# Patient Record
Sex: FEMALE | Race: WHITE
[De-identification: names, ages, dates, MRNs, and addresses within clinical notes are randomized per-mention and may not be internally consistent; named-entity substitution may affect disease eponyms.]

---

## 2021-11-08 ENCOUNTER — HOSPITAL ENCOUNTER (OUTPATIENT)
Dept: HOSPITAL 35 - SJCVC | Age: 71
End: 2021-11-08
Attending: INTERNAL MEDICINE
Payer: COMMERCIAL

## 2021-11-08 DIAGNOSIS — Z79.82: ICD-10-CM

## 2021-11-08 DIAGNOSIS — Z88.8: ICD-10-CM

## 2021-11-08 DIAGNOSIS — I25.10: ICD-10-CM

## 2021-11-08 DIAGNOSIS — N18.2: ICD-10-CM

## 2021-11-08 DIAGNOSIS — R94.31: Primary | ICD-10-CM

## 2021-11-08 DIAGNOSIS — I12.9: ICD-10-CM

## 2021-11-08 DIAGNOSIS — Z79.899: ICD-10-CM

## 2021-11-08 DIAGNOSIS — E78.5: ICD-10-CM

## 2021-11-08 DIAGNOSIS — Z88.0: ICD-10-CM

## 2021-11-08 DIAGNOSIS — I73.9: ICD-10-CM

## 2021-11-08 DIAGNOSIS — F32.9: ICD-10-CM

## 2021-11-08 DIAGNOSIS — R55: ICD-10-CM

## 2021-11-08 DIAGNOSIS — Z87.891: ICD-10-CM

## 2021-11-15 ENCOUNTER — HOSPITAL ENCOUNTER (OUTPATIENT)
Dept: HOSPITAL 35 - SJCVCIMAG | Age: 71
End: 2021-11-15
Attending: INTERNAL MEDICINE
Payer: COMMERCIAL

## 2021-11-15 DIAGNOSIS — I65.23: Primary | ICD-10-CM

## 2021-11-15 DIAGNOSIS — Z88.0: ICD-10-CM

## 2021-11-15 DIAGNOSIS — I12.9: ICD-10-CM

## 2021-11-15 DIAGNOSIS — F17.200: ICD-10-CM

## 2021-11-15 DIAGNOSIS — R53.83: ICD-10-CM

## 2021-11-15 DIAGNOSIS — Z79.899: ICD-10-CM

## 2021-11-15 DIAGNOSIS — N18.2: ICD-10-CM

## 2021-11-15 DIAGNOSIS — Z79.82: ICD-10-CM

## 2021-11-15 DIAGNOSIS — E72.11: ICD-10-CM

## 2021-11-15 DIAGNOSIS — I70.8: ICD-10-CM

## 2021-11-15 DIAGNOSIS — I25.10: ICD-10-CM

## 2021-11-15 DIAGNOSIS — F03.90: ICD-10-CM

## 2021-11-15 DIAGNOSIS — F41.8: ICD-10-CM

## 2021-11-15 DIAGNOSIS — I70.202: ICD-10-CM

## 2021-11-15 DIAGNOSIS — M79.604: ICD-10-CM

## 2021-11-15 DIAGNOSIS — E78.5: ICD-10-CM

## 2021-11-15 DIAGNOSIS — M79.605: ICD-10-CM

## 2021-11-15 DIAGNOSIS — Z88.8: ICD-10-CM

## 2021-11-15 DIAGNOSIS — R55: ICD-10-CM

## 2021-11-29 ENCOUNTER — HOSPITAL ENCOUNTER (OUTPATIENT)
Dept: HOSPITAL 35 - SJCVCIMAG | Age: 71
End: 2021-11-29
Attending: INTERNAL MEDICINE
Payer: COMMERCIAL

## 2021-11-29 DIAGNOSIS — R55: ICD-10-CM

## 2021-11-29 DIAGNOSIS — Z87.891: ICD-10-CM

## 2021-11-29 DIAGNOSIS — I25.10: Primary | ICD-10-CM

## 2021-11-29 DIAGNOSIS — Z79.899: ICD-10-CM

## 2021-11-29 DIAGNOSIS — I10: ICD-10-CM

## 2021-11-29 DIAGNOSIS — Z79.82: ICD-10-CM

## 2021-12-01 ENCOUNTER — HOSPITAL ENCOUNTER (OUTPATIENT)
Dept: HOSPITAL 35 - SJCVCIMAG | Age: 71
End: 2021-12-01
Attending: INTERNAL MEDICINE
Payer: COMMERCIAL

## 2021-12-01 DIAGNOSIS — R55: ICD-10-CM

## 2021-12-01 DIAGNOSIS — Z88.0: ICD-10-CM

## 2021-12-01 DIAGNOSIS — I73.9: ICD-10-CM

## 2021-12-01 DIAGNOSIS — R53.83: ICD-10-CM

## 2021-12-01 DIAGNOSIS — Z79.82: ICD-10-CM

## 2021-12-01 DIAGNOSIS — I13.10: ICD-10-CM

## 2021-12-01 DIAGNOSIS — M48.061: ICD-10-CM

## 2021-12-01 DIAGNOSIS — I08.0: Primary | ICD-10-CM

## 2021-12-01 DIAGNOSIS — Z98.890: ICD-10-CM

## 2021-12-01 DIAGNOSIS — I65.23: ICD-10-CM

## 2021-12-01 DIAGNOSIS — Z88.1: ICD-10-CM

## 2021-12-01 DIAGNOSIS — Z79.899: ICD-10-CM

## 2021-12-01 DIAGNOSIS — Z88.5: ICD-10-CM

## 2021-12-01 DIAGNOSIS — E55.9: ICD-10-CM

## 2021-12-01 DIAGNOSIS — F17.200: ICD-10-CM

## 2021-12-01 DIAGNOSIS — E78.5: ICD-10-CM

## 2021-12-01 DIAGNOSIS — Z95.5: ICD-10-CM

## 2021-12-01 DIAGNOSIS — Z88.8: ICD-10-CM

## 2021-12-01 DIAGNOSIS — N18.30: ICD-10-CM

## 2021-12-02 ENCOUNTER — HOSPITAL ENCOUNTER (OUTPATIENT)
Dept: HOSPITAL 35 - CATH | Age: 71
Discharge: HOME | End: 2021-12-02
Attending: RADIOLOGY
Payer: COMMERCIAL

## 2021-12-02 VITALS — BODY MASS INDEX: 26.8 KG/M2 | WEIGHT: 157 LBS | HEIGHT: 64 IN

## 2021-12-02 VITALS — DIASTOLIC BLOOD PRESSURE: 55 MMHG | SYSTOLIC BLOOD PRESSURE: 127 MMHG

## 2021-12-02 DIAGNOSIS — G47.00: ICD-10-CM

## 2021-12-02 DIAGNOSIS — Z79.899: ICD-10-CM

## 2021-12-02 DIAGNOSIS — I12.9: ICD-10-CM

## 2021-12-02 DIAGNOSIS — I70.1: ICD-10-CM

## 2021-12-02 DIAGNOSIS — Z87.891: ICD-10-CM

## 2021-12-02 DIAGNOSIS — I25.10: ICD-10-CM

## 2021-12-02 DIAGNOSIS — I73.9: ICD-10-CM

## 2021-12-02 DIAGNOSIS — Z98.890: ICD-10-CM

## 2021-12-02 DIAGNOSIS — Z20.822: ICD-10-CM

## 2021-12-02 DIAGNOSIS — R55: ICD-10-CM

## 2021-12-02 DIAGNOSIS — N18.30: ICD-10-CM

## 2021-12-02 DIAGNOSIS — E78.5: ICD-10-CM

## 2021-12-02 DIAGNOSIS — I65.23: Primary | ICD-10-CM

## 2021-12-02 LAB
ALBUMIN SERPL-MCNC: 3.9 G/DL (ref 3.4–5)
ALT SERPL-CCNC: 18 U/L (ref 14–59)
ANION GAP SERPL CALC-SCNC: 12 MMOL/L (ref 7–16)
APTT BLD: 25.3 SECONDS (ref 24.5–32.8)
AST SERPL-CCNC: 15 U/L (ref 15–37)
BILIRUB SERPL-MCNC: 0.6 MG/DL (ref 0.2–1)
BILIRUB UR-MCNC: NEGATIVE MG/DL
BUN SERPL-MCNC: 13 MG/DL (ref 7–18)
CALCIUM SERPL-MCNC: 8.6 MG/DL (ref 8.5–10.1)
CHLORIDE SERPL-SCNC: 103 MMOL/L (ref 98–107)
CO2 SERPL-SCNC: 22 MMOL/L (ref 21–32)
COLOR UR: YELLOW
CREAT SERPL-MCNC: 1.4 MG/DL (ref 0.6–1)
ERYTHROCYTE [DISTWIDTH] IN BLOOD BY AUTOMATED COUNT: 14.4 % (ref 10.5–14.5)
GLUCOSE SERPL-MCNC: 141 MG/DL (ref 74–106)
HCT VFR BLD CALC: 39.1 % (ref 37–47)
HGB BLD-MCNC: 12.6 GM/DL (ref 12–15)
INR PPP: 1
KETONES UR STRIP-MCNC: NEGATIVE MG/DL
MCH RBC QN AUTO: 29.4 PG (ref 26–34)
MCHC RBC AUTO-ENTMCNC: 32.3 G/DL (ref 28–37)
MCV RBC: 91.1 FL (ref 80–100)
PLATELET # BLD: 280 THOU/UL (ref 150–400)
POTASSIUM SERPL-SCNC: 3.9 MMOL/L (ref 3.5–5.1)
PROT SERPL-MCNC: 7.5 G/DL (ref 6.4–8.2)
PROTHROMBIN TIME: 10.9 SECONDS (ref 10.5–12.1)
RBC # BLD AUTO: 4.3 MIL/UL (ref 4.2–5)
RBC # UR STRIP: NEGATIVE /UL
SODIUM SERPL-SCNC: 137 MMOL/L (ref 136–145)
SP GR UR STRIP: <= 1.005 (ref 1–1.03)
URINE CLARITY: CLEAR
URINE GLUCOSE-RANDOM*: NEGATIVE
URINE LEUKOCYTES-REFLEX: NEGATIVE
URINE NITRITE-REFLEX: NEGATIVE
URINE PROTEIN (DIPSTICK): NEGATIVE
UROBILINOGEN UR STRIP-ACNC: 0.2 E.U./DL (ref 0.2–1)
WBC # BLD AUTO: 6.6 THOU/UL (ref 4–11)

## 2021-12-07 ENCOUNTER — HOSPITAL ENCOUNTER (OUTPATIENT)
Dept: HOSPITAL 35 - PAC | Age: 71
End: 2021-12-07
Attending: THORACIC SURGERY (CARDIOTHORACIC VASCULAR SURGERY)
Payer: COMMERCIAL

## 2021-12-07 DIAGNOSIS — I77.9: Primary | ICD-10-CM

## 2021-12-07 LAB
BASOPHILS NFR BLD AUTO: 1.1 % (ref 0–2)
BILIRUB UR-MCNC: NEGATIVE MG/DL
COLOR UR: YELLOW
EOSINOPHIL NFR BLD: 3.6 % (ref 0–3)
ERYTHROCYTE [DISTWIDTH] IN BLOOD BY AUTOMATED COUNT: 14.1 % (ref 10.5–14.5)
GRANULOCYTES NFR BLD MANUAL: 65.1 % (ref 36–66)
HCT VFR BLD CALC: 39.4 % (ref 37–47)
HGB BLD-MCNC: 12.8 GM/DL (ref 12–15)
KETONES UR STRIP-MCNC: NEGATIVE MG/DL
LYMPHOCYTES NFR BLD AUTO: 24.5 % (ref 24–44)
MCH RBC QN AUTO: 29.6 PG (ref 26–34)
MCHC RBC AUTO-ENTMCNC: 32.5 G/DL (ref 28–37)
MCV RBC: 91.2 FL (ref 80–100)
MONOCYTES NFR BLD: 5.7 % (ref 1–8)
NEUTROPHILS # BLD: 4.3 THOU/UL (ref 1.4–8.2)
PLATELET # BLD: 298 THOU/UL (ref 150–400)
RBC # BLD AUTO: 4.32 MIL/UL (ref 4.2–5)
RBC # UR STRIP: (no result) /UL
SP GR UR STRIP: 1.01 (ref 1–1.03)
URINE CLARITY: CLEAR
URINE GLUCOSE-RANDOM*: NEGATIVE
URINE LEUKOCYTES-REFLEX: NEGATIVE
URINE NITRITE-REFLEX: NEGATIVE
URINE PROTEIN (DIPSTICK): NEGATIVE
UROBILINOGEN UR STRIP-ACNC: 0.2 E.U./DL (ref 0.2–1)
WBC # BLD AUTO: 6.6 THOU/UL (ref 4–11)

## 2021-12-10 ENCOUNTER — HOSPITAL ENCOUNTER (INPATIENT)
Dept: HOSPITAL 35 - OR | Age: 71
LOS: 7 days | Discharge: HOME | DRG: 36 | End: 2021-12-17
Attending: THORACIC SURGERY (CARDIOTHORACIC VASCULAR SURGERY) | Admitting: THORACIC SURGERY (CARDIOTHORACIC VASCULAR SURGERY)
Payer: COMMERCIAL

## 2021-12-10 VITALS — HEIGHT: 64.02 IN | BODY MASS INDEX: 26.98 KG/M2 | WEIGHT: 158 LBS

## 2021-12-10 DIAGNOSIS — N18.30: ICD-10-CM

## 2021-12-10 DIAGNOSIS — I25.10: ICD-10-CM

## 2021-12-10 DIAGNOSIS — Z60.2: ICD-10-CM

## 2021-12-10 DIAGNOSIS — Z95.5: ICD-10-CM

## 2021-12-10 DIAGNOSIS — I70.1: ICD-10-CM

## 2021-12-10 DIAGNOSIS — I12.9: ICD-10-CM

## 2021-12-10 DIAGNOSIS — Z79.82: ICD-10-CM

## 2021-12-10 DIAGNOSIS — Z79.899: ICD-10-CM

## 2021-12-10 DIAGNOSIS — E78.5: ICD-10-CM

## 2021-12-10 DIAGNOSIS — I73.9: ICD-10-CM

## 2021-12-10 DIAGNOSIS — I65.23: Primary | ICD-10-CM

## 2021-12-10 DIAGNOSIS — Z20.822: ICD-10-CM

## 2021-12-10 DIAGNOSIS — Z90.711: ICD-10-CM

## 2021-12-10 DIAGNOSIS — G47.00: ICD-10-CM

## 2021-12-10 DIAGNOSIS — Z88.0: ICD-10-CM

## 2021-12-10 DIAGNOSIS — Z88.8: ICD-10-CM

## 2021-12-10 PROCEDURE — 62110: CPT

## 2021-12-10 PROCEDURE — 50010 RENAL EXPLORATION: CPT

## 2021-12-10 PROCEDURE — 52287 CYSTOSCOPY CHEMODENERVATION: CPT

## 2021-12-10 PROCEDURE — 10078: CPT

## 2021-12-10 PROCEDURE — 47375: CPT

## 2021-12-10 PROCEDURE — 56531: CPT

## 2021-12-10 PROCEDURE — 51301: CPT

## 2021-12-10 PROCEDURE — 50101: CPT

## 2021-12-10 PROCEDURE — 50386 REMOVE STENT VIA TRANSURETH: CPT

## 2021-12-10 PROCEDURE — 56526: CPT

## 2021-12-10 PROCEDURE — 54118: CPT

## 2021-12-10 PROCEDURE — 56524: CPT

## 2021-12-10 PROCEDURE — 50403: CPT

## 2021-12-10 PROCEDURE — 62900: CPT

## 2021-12-10 PROCEDURE — 48889: CPT

## 2021-12-10 PROCEDURE — 56528: CPT

## 2021-12-10 PROCEDURE — 70005: CPT

## 2021-12-10 PROCEDURE — 50455: CPT

## 2021-12-10 PROCEDURE — 65040: CPT

## 2021-12-10 SDOH — SOCIAL STABILITY - SOCIAL INSECURITY: PROBLEMS RELATED TO LIVING ALONE: Z60.2

## 2021-12-16 VITALS — DIASTOLIC BLOOD PRESSURE: 39 MMHG | SYSTOLIC BLOOD PRESSURE: 111 MMHG

## 2021-12-16 VITALS — SYSTOLIC BLOOD PRESSURE: 109 MMHG | DIASTOLIC BLOOD PRESSURE: 52 MMHG

## 2021-12-16 VITALS — SYSTOLIC BLOOD PRESSURE: 98 MMHG | DIASTOLIC BLOOD PRESSURE: 34 MMHG

## 2021-12-16 VITALS — DIASTOLIC BLOOD PRESSURE: 41 MMHG | SYSTOLIC BLOOD PRESSURE: 118 MMHG

## 2021-12-16 VITALS — SYSTOLIC BLOOD PRESSURE: 132 MMHG | DIASTOLIC BLOOD PRESSURE: 41 MMHG

## 2021-12-16 VITALS — SYSTOLIC BLOOD PRESSURE: 129 MMHG | DIASTOLIC BLOOD PRESSURE: 50 MMHG

## 2021-12-16 VITALS — SYSTOLIC BLOOD PRESSURE: 118 MMHG | DIASTOLIC BLOOD PRESSURE: 41 MMHG

## 2021-12-16 VITALS — SYSTOLIC BLOOD PRESSURE: 110 MMHG | DIASTOLIC BLOOD PRESSURE: 47 MMHG

## 2021-12-16 VITALS — SYSTOLIC BLOOD PRESSURE: 129 MMHG | DIASTOLIC BLOOD PRESSURE: 40 MMHG

## 2021-12-16 VITALS — DIASTOLIC BLOOD PRESSURE: 47 MMHG | SYSTOLIC BLOOD PRESSURE: 138 MMHG

## 2021-12-16 VITALS — DIASTOLIC BLOOD PRESSURE: 44 MMHG | SYSTOLIC BLOOD PRESSURE: 124 MMHG

## 2021-12-16 VITALS — SYSTOLIC BLOOD PRESSURE: 100 MMHG | DIASTOLIC BLOOD PRESSURE: 40 MMHG

## 2021-12-16 VITALS — SYSTOLIC BLOOD PRESSURE: 117 MMHG | DIASTOLIC BLOOD PRESSURE: 33 MMHG

## 2021-12-16 VITALS — SYSTOLIC BLOOD PRESSURE: 133 MMHG | DIASTOLIC BLOOD PRESSURE: 38 MMHG

## 2021-12-16 VITALS — SYSTOLIC BLOOD PRESSURE: 112 MMHG | DIASTOLIC BLOOD PRESSURE: 39 MMHG

## 2021-12-16 PROCEDURE — 037K3DZ DILATION OF RIGHT INTERNAL CAROTID ARTERY WITH INTRALUMINAL DEVICE, PERCUTANEOUS APPROACH: ICD-10-PCS | Performed by: THORACIC SURGERY (CARDIOTHORACIC VASCULAR SURGERY)

## 2021-12-16 NOTE — NUR
1245-RECEIVED PT FROM PACU VIA BED . BELONGINGSM PLACED IN
CLOSET.ORIENTED TO ROOM.--VW
1450-SON IN TO VISIT.PT ASKING FOR NICOTENE GUM/PATCH. SCHEDULED ATIVAN GIVEN.
NAUSEA RESOLVED.--VW

## 2021-12-17 VITALS — SYSTOLIC BLOOD PRESSURE: 142 MMHG | DIASTOLIC BLOOD PRESSURE: 98 MMHG

## 2021-12-17 VITALS — DIASTOLIC BLOOD PRESSURE: 49 MMHG | SYSTOLIC BLOOD PRESSURE: 134 MMHG

## 2021-12-17 VITALS — DIASTOLIC BLOOD PRESSURE: 46 MMHG | SYSTOLIC BLOOD PRESSURE: 132 MMHG

## 2021-12-17 VITALS — SYSTOLIC BLOOD PRESSURE: 148 MMHG | DIASTOLIC BLOOD PRESSURE: 54 MMHG

## 2021-12-17 VITALS — SYSTOLIC BLOOD PRESSURE: 140 MMHG | DIASTOLIC BLOOD PRESSURE: 43 MMHG

## 2021-12-17 VITALS — SYSTOLIC BLOOD PRESSURE: 131 MMHG | DIASTOLIC BLOOD PRESSURE: 48 MMHG

## 2021-12-17 VITALS — SYSTOLIC BLOOD PRESSURE: 134 MMHG | DIASTOLIC BLOOD PRESSURE: 53 MMHG

## 2021-12-17 VITALS — DIASTOLIC BLOOD PRESSURE: 104 MMHG | SYSTOLIC BLOOD PRESSURE: 145 MMHG

## 2021-12-17 VITALS — SYSTOLIC BLOOD PRESSURE: 137 MMHG | DIASTOLIC BLOOD PRESSURE: 49 MMHG

## 2021-12-17 VITALS — DIASTOLIC BLOOD PRESSURE: 36 MMHG | SYSTOLIC BLOOD PRESSURE: 128 MMHG

## 2021-12-17 VITALS — DIASTOLIC BLOOD PRESSURE: 41 MMHG | SYSTOLIC BLOOD PRESSURE: 140 MMHG

## 2021-12-17 VITALS — SYSTOLIC BLOOD PRESSURE: 160 MMHG | DIASTOLIC BLOOD PRESSURE: 65 MMHG

## 2021-12-17 VITALS — SYSTOLIC BLOOD PRESSURE: 145 MMHG | DIASTOLIC BLOOD PRESSURE: 48 MMHG

## 2021-12-17 VITALS — SYSTOLIC BLOOD PRESSURE: 145 MMHG | DIASTOLIC BLOOD PRESSURE: 52 MMHG

## 2021-12-17 VITALS — SYSTOLIC BLOOD PRESSURE: 135 MMHG | DIASTOLIC BLOOD PRESSURE: 41 MMHG

## 2021-12-17 LAB
ANION GAP SERPL CALC-SCNC: 9 MMOL/L (ref 7–16)
BUN SERPL-MCNC: 12 MG/DL (ref 7–18)
CALCIUM SERPL-MCNC: 8.1 MG/DL (ref 8.5–10.1)
CHLORIDE SERPL-SCNC: 106 MMOL/L (ref 98–107)
CO2 SERPL-SCNC: 25 MMOL/L (ref 21–32)
CREAT SERPL-MCNC: 1.2 MG/DL (ref 0.6–1)
ERYTHROCYTE [DISTWIDTH] IN BLOOD BY AUTOMATED COUNT: 14.3 % (ref 10.5–14.5)
GLUCOSE SERPL-MCNC: 111 MG/DL (ref 74–106)
HCT VFR BLD CALC: 30.5 % (ref 37–47)
HGB BLD-MCNC: 10.3 GM/DL (ref 12–15)
MCH RBC QN AUTO: 30.6 PG (ref 26–34)
MCHC RBC AUTO-ENTMCNC: 33.7 G/DL (ref 28–37)
MCV RBC: 90.9 FL (ref 80–100)
PLATELET # BLD: 239 THOU/UL (ref 150–400)
POTASSIUM SERPL-SCNC: 4.7 MMOL/L (ref 3.5–5.1)
RBC # BLD AUTO: 3.36 MIL/UL (ref 4.2–5)
SODIUM SERPL-SCNC: 140 MMOL/L (ref 136–145)
WBC # BLD AUTO: 9.5 THOU/UL (ref 4–11)

## 2021-12-17 NOTE — NUR
DC ORDERS RECEIVED, CALL PLACED TO PATIENT'S SON REGARDING DC AND
TRANSPORTATION. SON TALKED TO PATIENT ON THE PHONE. PATIENT BELONGINGS GIVEN
BACK TO PATIENT AND DRESSED READY TO GO. PER SON RIDE IS ABOUT 45MINUTES AWAY.
PATIENT PACING IN THE ROOM AND BY THE DOOR AND REDIRECTED MULTIPLE TIMES TO
SIT BACK IN THE ROOM AND WAIT FOR HER RIDE. SCHEDULED LORAZEPAM ADMINISTERED.

## 2021-12-17 NOTE — NUR
INITIAL ASSESSMENT/DISCHARGE NOTE:
SW reveiwed chart and spoke with nursing. Pt was admitted from home following
CTS procedure. Pt with hx carotid stenosis. Pt to discharge home today. SW met
with pt at bedside. Introduced role of SW. Pt is alert/orientated x 4. Pt
reports she lives at home alone. Prior to admission, pt was independent with
ADLs. No use of DME. No hx of HH or post-acute placement. Pt's PCP is Dr. Denny Bullock. No discharge needs identified. Pt will have transportation
home when discharged. SW is available to assist should needs arise.

## 2021-12-17 NOTE — NUR
ALERT AND ORIENTED WITH OCCASIONAL CONFUSION AND CAN BE IMPULSIVE AT TIMES.
VITALS STABLE. UP TO CHAIR WITH MINIMAL ASSISTANCE. ABLE TO VOID IN THE
TOILET. TOLORATED DIET W/O NAUSEA, CLEARED BY S.T. OCASSIONALLY YELLS OUT TO
NURSE TRYING TO HAVE A CONVERSATION FROM THE ROOM. REDIRECTED SEVERAL TIMES TO
STAY IN THE CHAIR AND CALL FOR ASSISTANCE AS NEEDED. CHAIR ALARM IN PLACE.
PATIENT GETTING UP W/O USING CALL LIGHT. STATES SHE WANTS TO GET DRESSED SO
SHE CAN GO HOME.

## 2021-12-18 NOTE — O
[facility]
[address]
[place]   [address]                     OPERATIVE REPORT              
_______________________________________________________________________________
 
Name:       SHELBI RANGEL          Room #:         249-P       [facility] [place]  
M.R.#:      [id number]                       Account #:      [id number]  
Admission:  12/16/21    Attend Phys:    Hernán Ahuja MD    
Discharge:  12/17/21    Date of Birth:  [birth year]  
                                                          Report #: [id number]
                                                                    [id number] 
_______________________________________________________________________________
THIS REPORT FOR:  
 
cc:  Denny Bullock MD, Steven A. MD Forman,Hernán TILLEY MD                                            ~
 
DATE OF SERVICE: 12/16/2021
 
PREOPERATIVE DIAGNOSIS:  Right carotid artery stenosis.
 
POSTOPERATIVE DIAGNOSIS:  Right carotid artery stenosis.
 
OPERATION:  Transcarotid arterial revascularization, right.
 
SURGEONS:  Dr. Hernán Ahuja and Dr. Khalif Gamez.
 
ASSISTANT:  KRISTOPHER Guthrie.
 
ANESTHESIA:  General.
 
INDICATIONS:  The patient is a 71-year-old with bilateral carotid artery 
disease.  The patient presents with dizziness and near syncope. Right side has a
98% internal carotid stenosis and the left side has a 90% stenosis.
 
FINDINGS AND TECHNIQUE:  After general anesthesia was established, an incision 
was made in the right neck to expose the common carotid artery.  The artery was 
exposed and controlled and a 5-0 Prolene pursestring suture was made in it.
 
Separately, the left femoral vein was identified with the ultrasound and entered
with the arterial needle and then using Seldinger technique, the venous 
component of the TCAR AV fistula was placed.
 
Then, 10,000 units of heparin were given through the pursestring suture.  The 
arterial needle was placed in the common carotid and using Seldinger technique, 
the arterial component of the AV fistula was placed.  The AV fistula was 
activated and when the ACT was satisfactory, inflow through the common carotid 
was occluded.
 
An arteriogram was taken, which identified the lesion.  Guidewire was placed 
into the internal carotid and then the predilatation angioplasty was performed 
with a 4.5 x 30 Cordis balloon.
 
A 10 x 40 Enroute stent was placed and this was followed by post-stent 
dilatation using 6 x 30 Cordis balloon.
 
The requisite time was allowed to elapse and then the final arteriogram was 
 
 
 
[facility]
[address]
[place]   [address]                     OPERATIVE REPORT              
_______________________________________________________________________________
 
Name:       SHELBI RANGEL          Room #:         249-P       [facility] [place]  
..#:      [id number]                       Account #:      [id number]  
Admission:  12/16/21    Attend Phys:    Hernán Ahuja MD    
Discharge:  12/17/21    Date of Birth:  [birth year]  
                                                          Report #: [id number]
                                                                    [id number] 
_______________________________________________________________________________
 
taken.  This showed good correction of the lesion.
 
Forward flow was reestablished and the AV fistula was dismantled.  The arterial 
component was removed and the pursestring suture was pulled tight and the 
reinforcing suture was placed.  The venous component was removed from the 
femoral vein and pressure was applied to the groin.
 
Protamine was given to reverse the heparin.  Hemostasis was ascertained in the 
neck wound and then it was closed in layers.  The patient was taken to the 
recovery area in good condition having tolerated the procedure well, where her 
neurologic progress was monitored.  All counts were reported as correct.
 
 
 
 
 
 
 
 
 
 
 
 
 
 
 
 
 
 
 
 
 
 
 
 
 
 
 
 
 
 
 
 
  <ELECTRONICALLY SIGNED>
   By: Hernán Ahuja MD            
  12/18/21     1023
D: 12/17/21 1325                           _____________________________________
T: 12/17/21 1336                           Hernán Ahuja MD              /nt

## 2022-02-11 ENCOUNTER — HOSPITAL ENCOUNTER (OUTPATIENT)
Dept: HOSPITAL 35 - SJCVCIMAG | Age: 72
End: 2022-02-11
Attending: RADIOLOGY
Payer: COMMERCIAL

## 2022-02-11 DIAGNOSIS — I65.23: Primary | ICD-10-CM

## 2022-02-11 DIAGNOSIS — I70.8: ICD-10-CM

## 2022-02-11 DIAGNOSIS — Z95.828: ICD-10-CM

## 2022-02-11 DIAGNOSIS — I73.9: ICD-10-CM

## 2022-02-16 ENCOUNTER — HOSPITAL ENCOUNTER (OUTPATIENT)
Dept: HOSPITAL 35 - SJCVC | Age: 72
End: 2022-02-16
Attending: RADIOLOGY
Payer: COMMERCIAL

## 2022-02-16 DIAGNOSIS — Z79.899: ICD-10-CM

## 2022-02-16 DIAGNOSIS — F17.210: ICD-10-CM

## 2022-02-16 DIAGNOSIS — Z88.8: ICD-10-CM

## 2022-02-16 DIAGNOSIS — I77.9: Primary | ICD-10-CM

## 2022-02-16 DIAGNOSIS — N18.30: ICD-10-CM

## 2022-02-16 DIAGNOSIS — I12.9: ICD-10-CM

## 2022-02-16 DIAGNOSIS — Z82.49: ICD-10-CM

## 2022-02-16 DIAGNOSIS — Z79.82: ICD-10-CM

## 2022-02-16 DIAGNOSIS — Z88.1: ICD-10-CM

## 2022-02-16 DIAGNOSIS — I73.9: ICD-10-CM

## 2022-02-16 DIAGNOSIS — I25.10: ICD-10-CM

## 2022-02-16 DIAGNOSIS — E78.5: ICD-10-CM
